# Patient Record
Sex: FEMALE | Race: BLACK OR AFRICAN AMERICAN | NOT HISPANIC OR LATINO | Employment: FULL TIME | URBAN - METROPOLITAN AREA
[De-identification: names, ages, dates, MRNs, and addresses within clinical notes are randomized per-mention and may not be internally consistent; named-entity substitution may affect disease eponyms.]

---

## 2023-02-09 ENCOUNTER — HOSPITAL ENCOUNTER (EMERGENCY)
Facility: HOSPITAL | Age: 48
Discharge: HOME | End: 2023-02-09
Attending: EMERGENCY MEDICINE
Payer: OTHER GOVERNMENT

## 2023-02-09 VITALS
HEART RATE: 74 BPM | BODY MASS INDEX: 30.37 KG/M2 | RESPIRATION RATE: 16 BRPM | TEMPERATURE: 98 F | WEIGHT: 189 LBS | HEIGHT: 66 IN | OXYGEN SATURATION: 96 % | DIASTOLIC BLOOD PRESSURE: 76 MMHG | SYSTOLIC BLOOD PRESSURE: 127 MMHG

## 2023-02-09 DIAGNOSIS — R47.1 DYSARTHRIA: Primary | ICD-10-CM

## 2023-02-09 PROBLEM — R20.0 RIGHT SIDED NUMBNESS: Status: ACTIVE | Noted: 2023-02-09

## 2023-02-09 LAB
ALBUMIN SERPL BCP-MCNC: 4 G/DL (ref 3.5–5.2)
ALP SERPL-CCNC: 122 U/L (ref 55–135)
ALT SERPL W/O P-5'-P-CCNC: 63 U/L (ref 10–44)
ANION GAP SERPL CALC-SCNC: 12 MMOL/L (ref 8–16)
AST SERPL-CCNC: 41 U/L (ref 10–40)
B-HCG UR QL: NEGATIVE
BASOPHILS # BLD AUTO: 0.03 K/UL (ref 0–0.2)
BASOPHILS NFR BLD: 0.5 % (ref 0–1.9)
BILIRUB SERPL-MCNC: 0.2 MG/DL (ref 0.1–1)
BUN SERPL-MCNC: 14 MG/DL (ref 6–20)
CALCIUM SERPL-MCNC: 10.3 MG/DL (ref 8.7–10.5)
CHLORIDE SERPL-SCNC: 110 MMOL/L (ref 95–110)
CHOLEST SERPL-MCNC: 215 MG/DL (ref 120–199)
CHOLEST/HDLC SERPL: 4.1 {RATIO} (ref 2–5)
CO2 SERPL-SCNC: 19 MMOL/L (ref 23–29)
CREAT SERPL-MCNC: 0.8 MG/DL (ref 0.5–1.4)
CREAT SERPL-MCNC: 0.8 MG/DL (ref 0.5–1.4)
CTP QC/QA: YES
DIFFERENTIAL METHOD: ABNORMAL
EOSINOPHIL # BLD AUTO: 0.1 K/UL (ref 0–0.5)
EOSINOPHIL NFR BLD: 0.9 % (ref 0–8)
ERYTHROCYTE [DISTWIDTH] IN BLOOD BY AUTOMATED COUNT: 13.4 % (ref 11.5–14.5)
EST. GFR  (NO RACE VARIABLE): >60 ML/MIN/1.73 M^2
GLUCOSE SERPL-MCNC: 116 MG/DL (ref 70–110)
HCT VFR BLD AUTO: 40.5 % (ref 37–48.5)
HDLC SERPL-MCNC: 52 MG/DL (ref 40–75)
HDLC SERPL: 24.2 % (ref 20–50)
HGB BLD-MCNC: 12.9 G/DL (ref 12–16)
IMM GRANULOCYTES # BLD AUTO: 0.02 K/UL (ref 0–0.04)
IMM GRANULOCYTES NFR BLD AUTO: 0.3 % (ref 0–0.5)
INR PPP: 1 (ref 0.8–1.2)
LDLC SERPL CALC-MCNC: 151.6 MG/DL (ref 63–159)
LYMPHOCYTES # BLD AUTO: 2 K/UL (ref 1–4.8)
LYMPHOCYTES NFR BLD: 30 % (ref 18–48)
MCH RBC QN AUTO: 28 PG (ref 27–31)
MCHC RBC AUTO-ENTMCNC: 31.9 G/DL (ref 32–36)
MCV RBC AUTO: 88 FL (ref 82–98)
MONOCYTES # BLD AUTO: 0.5 K/UL (ref 0.3–1)
MONOCYTES NFR BLD: 7.1 % (ref 4–15)
NEUTROPHILS # BLD AUTO: 4 K/UL (ref 1.8–7.7)
NEUTROPHILS NFR BLD: 61.2 % (ref 38–73)
NONHDLC SERPL-MCNC: 163 MG/DL
NRBC BLD-RTO: 0 /100 WBC
PLATELET # BLD AUTO: 310 K/UL (ref 150–450)
PMV BLD AUTO: 9.7 FL (ref 9.2–12.9)
POC PTINR: 1.8 (ref 0.9–1.2)
POC PTWBT: 21 SEC (ref 9.7–14.3)
POCT GLUCOSE: 102 MG/DL (ref 70–110)
POTASSIUM SERPL-SCNC: 3.8 MMOL/L (ref 3.5–5.1)
PROT SERPL-MCNC: 7.6 G/DL (ref 6–8.4)
PROTHROMBIN TIME: 10.8 SEC (ref 9–12.5)
RBC # BLD AUTO: 4.6 M/UL (ref 4–5.4)
SAMPLE: ABNORMAL
SAMPLE: NORMAL
SODIUM SERPL-SCNC: 141 MMOL/L (ref 136–145)
TRIGL SERPL-MCNC: 57 MG/DL (ref 30–150)
TSH SERPL DL<=0.005 MIU/L-ACNC: 0.53 UIU/ML (ref 0.4–4)
WBC # BLD AUTO: 6.59 K/UL (ref 3.9–12.7)

## 2023-02-09 PROCEDURE — 99283 PR EMERGENCY DEPT VISIT,LEVEL III: ICD-10-PCS | Mod: ,,, | Performed by: STUDENT IN AN ORGANIZED HEALTH CARE EDUCATION/TRAINING PROGRAM

## 2023-02-09 PROCEDURE — 93005 ELECTROCARDIOGRAM TRACING: CPT

## 2023-02-09 PROCEDURE — 99900035 HC TECH TIME PER 15 MIN (STAT)

## 2023-02-09 PROCEDURE — 81025 URINE PREGNANCY TEST: CPT | Performed by: EMERGENCY MEDICINE

## 2023-02-09 PROCEDURE — 99283 EMERGENCY DEPT VISIT LOW MDM: CPT | Mod: ,,, | Performed by: STUDENT IN AN ORGANIZED HEALTH CARE EDUCATION/TRAINING PROGRAM

## 2023-02-09 PROCEDURE — 99285 EMERGENCY DEPT VISIT HI MDM: CPT | Mod: 25

## 2023-02-09 PROCEDURE — 93010 EKG 12-LEAD: ICD-10-PCS | Mod: ,,, | Performed by: INTERNAL MEDICINE

## 2023-02-09 PROCEDURE — 82565 ASSAY OF CREATININE: CPT | Mod: 91

## 2023-02-09 PROCEDURE — 99291 PR CRITICAL CARE, E/M 30-74 MINUTES: ICD-10-PCS | Mod: ,,, | Performed by: EMERGENCY MEDICINE

## 2023-02-09 PROCEDURE — 93010 ELECTROCARDIOGRAM REPORT: CPT | Mod: ,,, | Performed by: INTERNAL MEDICINE

## 2023-02-09 PROCEDURE — 85610 PROTHROMBIN TIME: CPT

## 2023-02-09 PROCEDURE — 84443 ASSAY THYROID STIM HORMONE: CPT | Performed by: EMERGENCY MEDICINE

## 2023-02-09 PROCEDURE — 25000003 PHARM REV CODE 250: Performed by: EMERGENCY MEDICINE

## 2023-02-09 PROCEDURE — 99291 CRITICAL CARE FIRST HOUR: CPT | Mod: ,,, | Performed by: EMERGENCY MEDICINE

## 2023-02-09 PROCEDURE — 85025 COMPLETE CBC W/AUTO DIFF WBC: CPT | Performed by: EMERGENCY MEDICINE

## 2023-02-09 PROCEDURE — 82962 GLUCOSE BLOOD TEST: CPT

## 2023-02-09 PROCEDURE — 80053 COMPREHEN METABOLIC PANEL: CPT | Performed by: EMERGENCY MEDICINE

## 2023-02-09 PROCEDURE — 85610 PROTHROMBIN TIME: CPT | Performed by: EMERGENCY MEDICINE

## 2023-02-09 PROCEDURE — 80061 LIPID PANEL: CPT | Performed by: EMERGENCY MEDICINE

## 2023-02-09 RX ORDER — LORAZEPAM 1 MG/1
1 TABLET ORAL
Status: COMPLETED | OUTPATIENT
Start: 2023-02-09 | End: 2023-02-09

## 2023-02-09 RX ADMIN — LORAZEPAM 1 MG: 1 TABLET ORAL at 06:02

## 2023-02-09 NOTE — HPI
Ms Lamas is a 47yoF with fibromyalgia who presented to the ED on 2/9/22 after acute onset stuttering and RUE numbness. The patient was returning to work at 1230, from lunch, and collapsed while in the elevator. She had no associated LOC. She was caught before hitting the ground by coworkers. LKN and symptom onset at 1230PM. The patient, of note, had another event on 2/7 - during which she had LUE tremor without any other symptoms. The patient, on arrival to ED, had stuttering speech with LUE numbness and L lower face numbness compared to contralateral side. The patient additionally had significant resting and intentional tremor of LUE. VN service consulted for further workup.

## 2023-02-09 NOTE — ED TRIAGE NOTES
Pt reports having syncopal episode with R sided numbness, blurred vision, and tremors to R upper extremity. Pt currently aphasic and states new onset aphasia started today. Pt AAOx4. Pt placed on cardiac monitor and cont pulse ox.

## 2023-02-09 NOTE — SUBJECTIVE & OBJECTIVE
No past medical history on file.  No past surgical history on file.  No family history on file.     Review of patient's allergies indicates:  No Known Allergies    Medications: I have reviewed the current medication administration record.    (Not in a hospital admission)      Review of Systems   Constitutional:  Negative for chills and fever.   HENT:  Negative for rhinorrhea and sneezing.    Eyes:  Negative for discharge and redness.   Respiratory:  Negative for cough and wheezing.    Cardiovascular:  Negative for chest pain and palpitations.   Gastrointestinal:  Negative for nausea and vomiting.   Skin:  Negative for pallor and rash.   Neurological:  Positive for speech difficulty (stuttering speech) and numbness. Negative for facial asymmetry and weakness.   Psychiatric/Behavioral:  Negative for confusion and decreased concentration.    Objective:     Vital Signs (Most Recent):  Temp: 98 °F (36.7 °C) (02/09/23 1426)  Pulse: 72 (02/09/23 1426)  Resp: 16 (02/09/23 1426)  BP: (!) 150/90 (02/09/23 1426)  SpO2: 97 % (02/09/23 1426)    Vital Signs Range (Last 24H):  Temp:  [98 °F (36.7 °C)]   Pulse:  [72]   Resp:  [16]   BP: (150)/(90)   SpO2:  [97 %]     Physical Exam  Constitutional:       Appearance: Normal appearance.   HENT:      Head: Normocephalic and atraumatic.      Nose: Nose normal.      Mouth/Throat:      Mouth: Mucous membranes are moist.      Pharynx: Oropharynx is clear.   Eyes:      General: No scleral icterus.     Extraocular Movements: Extraocular movements intact.      Conjunctiva/sclera: Conjunctivae normal.   Pulmonary:      Effort: Pulmonary effort is normal. No respiratory distress.   Abdominal:      General: Abdomen is flat. There is no distension.   Musculoskeletal:         General: No tenderness or deformity. Normal range of motion.      Cervical back: Normal range of motion. No rigidity.   Skin:     General: Skin is warm and dry.      Coloration: Skin is not jaundiced.   Neurological:       Mental Status: She is alert and oriented to person, place, and time.      Cranial Nerves: No cranial nerve deficit.      Sensory: Sensory deficit present.      Motor: No weakness.      Coordination: Coordination normal.       Neurological Exam:   LOC: alert  Attention Span: Good   Language: No aphasia  Articulation: No dysarthria  Orientation: Person, Place, Time   Visual Fields: Full  EOM (CN III, IV, VI): Full/intact  Facial Sensation (CN V): Facial sensory loss  Facial Movement (CN VII): Symmetric facial expression    Motor: Arm left  Normal 5/5  Leg left  Normal 5/5  Arm right  Normal 5/5  Leg right Normal 5/5  Cerebellum: No evidence of appendicular or axial ataxia  Sensation: Hugo-hypoesthesia right      Laboratory:  CMP:   Recent Labs   Lab 02/09/23  1504   CALCIUM 10.3   ALBUMIN 4.0   PROT 7.6      K 3.8   CO2 19*      BUN 14   CREATININE 0.8   ALKPHOS 122   ALT 63*   AST 41*   BILITOT 0.2     CBC:   Recent Labs   Lab 02/09/23  1504   WBC 6.59   RBC 4.60   HGB 12.9   HCT 40.5      MCV 88   MCH 28.0   MCHC 31.9*       MRI brain/MRA H/N pending

## 2023-02-09 NOTE — ASSESSMENT & PLAN NOTE
Ms Lamas is a 47yoF with fibromyalgia who presented to the ED on 2/9/22 after acute onset stuttering and RUE numbness. The patient was returning to work at 1230, from lunch, and collapsed while in the elevator. She had no associated LOC. She was caught before hitting the ground by coworkers. LKN and symptom onset at 1230PM. The patient, of note, had another event on 2/7 - during which she had LUE tremor without any other symptoms. The patient, on arrival to ED, had stuttering speech with LUE numbness and L lower face numbness compared to contralateral side. The patient additionally had significant resting and intentional tremor of LUE. VN service consulted for further workup.    Patients exam consisting of stuttering speech and R numbness compared to L.  Lower concern for stroke at this time given clinical presentation. Higher concern for panic attack  MRI brain with MRA H/N ordered for further workup to r/o stroke    Will follow up MRI/A; please call when completed  Will sign off if MRI/A are without significant/acute findings  Please call with any other questions or concerns  Appreciate consultation

## 2023-02-09 NOTE — CONSULTS
[facility] - Emergency Dept  Vascular Neurology  [facility]  Consult Note    Inpatient consult to Vascular (Stroke) Neurology  Consult performed by: Everett Krishnamurthy MD  Consult ordered by: Isidro Brady MD      Assessment/Plan:     Patient is a 47 y.o. year old female with:    Right sided numbness  Ms Lamas is a 47yoF with fibromyalgia who presented to the ED on 2/9/22 after acute onset stuttering and RUE numbness. The patient was returning to work at 1230, from lunch, and collapsed while in the elevator. She had no associated LOC. She was caught before hitting the ground by coworkers. LKN and symptom onset at 1230PM. The patient, of note, had another event on 2/7 - during which she had LUE tremor without any other symptoms. The patient, on arrival to ED, had stuttering speech with LUE numbness and L lower face numbness compared to contralateral side. The patient additionally had significant resting and intentional tremor of LUE. VN service consulted for further workup.    Patients exam consisting of stuttering speech and R numbness compared to L.  Lower concern for stroke at this time given clinical presentation. Higher concern for panic attack  MRI brain with MRA H/N ordered for further workup to r/o stroke    Will follow up MRI/A; please call when completed  Will sign off if MRI/A are without significant/acute findings  Please call with any other questions or concerns  Appreciate consultation          STROKE DOCUMENTATION     Acute Stroke Times   Last Known Normal Date: 02/09/23  Last Known Normal Time: 1230  Symptom Onset Date: 02/09/23  Symptom Onset Time: 1230  Stroke Team Called Date: 02/09/23  Stroke Team Called Time: 1433  Stroke Team Arrival Date: 02/09/23  Stroke Team Arrival Time: 1436  Thrombolytic Therapy Recommended: No  Thrombectomy Recommended: No    NIH Scale:  1a. Level of Consciousness: 0-->Alert, keenly responsive  1b. LOC Questions: 0-->Answers both questions correctly  1c.  LOC Commands: 0-->Performs both tasks correctly  2. Best Gaze: 0-->Normal  3. Visual: 0-->No visual loss  4. Facial Palsy: 0-->Normal symmetrical movements  5a. Motor Arm, Left: 0-->No drift, limb holds 90 (or 45) degrees for full 10 secs  5b. Motor Arm, Right: 0-->No drift, limb holds 90 (or 45) degrees for full 10 secs  6a. Motor Leg, Left: 0-->No drift, leg holds 30 degree position for full 5 secs  6b. Motor Leg, Right: 0-->No drift, leg holds 30 degree position for full 5 secs  7. Limb Ataxia: 0-->Absent  8. Sensory: 1-->Mild-to-moderate sensory loss, patient feels pinprick is less sharp or is dull on the affected side, or there is a loss of superficial pain with pinprick, but patient is aware of being touched  9. Best Language: 0-->No aphasia, normal  10. Dysarthria: 0-->Normal  11. Extinction and Inattention (formerly Neglect): 0-->No abnormality  Total (NIH Stroke Scale): 1    Modified Dickinson Score: 1  Miller Coma Scale:    ABCD2 Score:    [id number] Score:   HAS -BLED Score:   ICH Score:   Hunt & Short Classification:       Thrombolysis Candidate? No, Non-disabling symptoms - Low NIHSS     Delays to Thrombolysis?  No    Interventional Revascularization Candidate?   Is the patient eligible for mechanical endovascular reperfusion (TAMIR)?  No; at this time symptoms not suggestive of large vessel occlusion    Delays to Thrombectomy? No    Hemorrhagic change of an Ischemic Stroke: Does this patient have an ischemic stroke with hemorrhagic changes? No     Subjective:     History of Present Illness:  Ms Lamas is a 47yoF with fibromyalgia who presented to the ED on 2/9/22 after acute onset stuttering and RUE numbness. The patient was returning to work at 1230, from lunch, and collapsed while in the elevator. She had no associated LOC. She was caught before hitting the ground by coworkers. LKN and symptom onset at 1230PM. The patient, of note, had another event on 2/7 - during which she had LUE tremor without any  other symptoms. The patient, on arrival to ED, had stuttering speech with LUE numbness and L lower face numbness compared to contralateral side. The patient additionally had significant resting and intentional tremor of LUE. VN service consulted for further workup.       Review of patient's allergies indicates:  No Known Allergies    Medications: I have reviewed the current medication administration record.    (Not in a hospital admission)      Review of Systems   Constitutional:  Negative for chills and fever.   HENT:  Negative for rhinorrhea and sneezing.    Eyes:  Negative for discharge and redness.   Respiratory:  Negative for cough and wheezing.    Cardiovascular:  Negative for chest pain and palpitations.   Gastrointestinal:  Negative for nausea and vomiting.   Skin:  Negative for pallor and rash.   Neurological:  Positive for speech difficulty (stuttering speech) and numbness. Negative for facial asymmetry and weakness.   Psychiatric/Behavioral:  Negative for confusion and decreased concentration.    Objective:     Vital Signs (Most Recent):  Temp: 98 °F (36.7 °C) (02/09/23 1426)  Pulse: 72 (02/09/23 1426)  Resp: 16 (02/09/23 1426)  BP: (!) 150/90 (02/09/23 1426)  SpO2: 97 % (02/09/23 1426)    Vital Signs Range (Last 24H):  Temp:  [98 °F (36.7 °C)]   Pulse:  [72]   Resp:  [16]   BP: (150)/(90)   SpO2:  [97 %]     Physical Exam  Constitutional:       Appearance: Normal appearance.   HENT:      Head: Normocephalic and atraumatic.      Nose: Nose normal.      Mouth/Throat:      Mouth: Mucous membranes are moist.      Pharynx: Oropharynx is clear.   Eyes:      General: No scleral icterus.     Extraocular Movements: Extraocular movements intact.      Conjunctiva/sclera: Conjunctivae normal.   Pulmonary:      Effort: Pulmonary effort is normal. No respiratory distress.   Abdominal:      General: Abdomen is flat. There is no distension.   Musculoskeletal:         General: No tenderness or deformity. Normal range of  motion.      Cervical back: Normal range of motion. No rigidity.   Skin:     General: Skin is warm and dry.      Coloration: Skin is not jaundiced.   Neurological:      Mental Status: She is alert and oriented to person, place, and time.      Cranial Nerves: No cranial nerve deficit.      Sensory: Sensory deficit present.      Motor: No weakness.      Coordination: Coordination normal.       Neurological Exam:   LOC: alert  Attention Span: Good   Language: No aphasia  Articulation: No dysarthria  Orientation: Person, Place, Time   Visual Fields: Full  EOM (CN III, IV, VI): Full/intact  Facial Sensation (CN V): Facial sensory loss  Facial Movement (CN VII): Symmetric facial expression    Motor: Arm left  Normal 5/5  Leg left  Normal 5/5  Arm right  Normal 5/5  Leg right Normal 5/5  Cerebellum: No evidence of appendicular or axial ataxia  Sensation: Huog-hypoesthesia right      Laboratory:  CMP:   Recent Labs   Lab 02/09/23  1504   CALCIUM 10.3   ALBUMIN 4.0   PROT 7.6      K 3.8   CO2 19*      BUN 14   CREATININE 0.8   ALKPHOS 122   ALT 63*   AST 41*   BILITOT 0.2     CBC:   Recent Labs   Lab 02/09/23  1504   WBC 6.59   RBC 4.60   HGB 12.9   HCT 40.5      MCV 88   MCH 28.0   MCHC 31.9*       MRI brain/MRA H/N pending    Everett Krishnamurthy MD  [facility]  Department of Vascular Neurology   [facility]

## 2023-02-09 NOTE — SUBJECTIVE & OBJECTIVE
No past medical history on file.  No past surgical history on file.  No family history on file.     Review of patient's allergies indicates:  Not on File    Medications: I have reviewed the current medication administration record.    (Not in a hospital admission)      Review of Systems   Constitutional:  Negative for chills and fever.   HENT:  Negative for rhinorrhea and sneezing.    Eyes:  Negative for discharge and redness.   Respiratory:  Negative for cough and wheezing.    Cardiovascular:  Negative for chest pain and palpitations.   Gastrointestinal:  Negative for nausea and vomiting.   Skin:  Negative for pallor and rash.   Neurological:  Positive for speech difficulty (stuttering speech) and numbness. Negative for facial asymmetry and weakness.   Psychiatric/Behavioral:  Negative for confusion and decreased concentration.    Objective:     Vital Signs (Most Recent):  Temp: 98 °F (36.7 °C) (02/09/23 1426)  Pulse: 72 (02/09/23 1426)  Resp: 16 (02/09/23 1426)  BP: (!) 150/90 (02/09/23 1426)  SpO2: 97 % (02/09/23 1426)    Vital Signs Range (Last 24H):  Temp:  [98 °F (36.7 °C)]   Pulse:  [72]   Resp:  [16]   BP: (150)/(90)   SpO2:  [97 %]     Physical Exam  Constitutional:       Appearance: Normal appearance.   HENT:      Head: Normocephalic and atraumatic.      Nose: Nose normal.      Mouth/Throat:      Mouth: Mucous membranes are moist.      Pharynx: Oropharynx is clear.   Eyes:      General: No scleral icterus.     Extraocular Movements: Extraocular movements intact.      Conjunctiva/sclera: Conjunctivae normal.   Pulmonary:      Effort: Pulmonary effort is normal. No respiratory distress.   Abdominal:      General: Abdomen is flat. There is no distension.   Musculoskeletal:         General: No tenderness or deformity. Normal range of motion.      Cervical back: Normal range of motion. No rigidity.   Skin:     General: Skin is warm and dry.      Coloration: Skin is not jaundiced.   Neurological:      Mental  Status: She is alert and oriented to person, place, and time.      Cranial Nerves: No cranial nerve deficit.      Sensory: Sensory deficit present.      Motor: No weakness.      Coordination: Coordination normal.       Neurological Exam:   LOC: alert  Attention Span: Good   Language: No aphasia  Articulation: No dysarthria  Orientation: Person, Place, Time   Visual Fields: Full  EOM (CN III, IV, VI): Full/intact  Facial Sensation (CN V): Facial sensory loss  Facial Movement (CN VII): Symmetric facial expression    Motor: Arm left  Normal 5/5  Leg left  Normal 5/5  Arm right  Normal 5/5  Leg right Normal 5/5  Cerebellum: No evidence of appendicular or axial ataxia  Sensation: Hugo-hypoesthesia right      Laboratory:  CMP: No results for input(s): GLUCOSE, CALCIUM, ALBUMIN, PROT, NA, K, CO2, CL, BUN, CREATININE, ALKPHOS, ALT, AST, BILITOT in the last 24 hours.  CBC: No results for input(s): WBC, RBC, HGB, HCT, PLT, MCV, MCH, MCHC in the last 168 hours.

## 2023-02-09 NOTE — ED PROVIDER NOTES
Encounter Date: 2/9/2023       History     Chief Complaint   Patient presents with    Van negative stroke      LKN 12:30pm today, numbness to left side, no headache, no blood thinners, no Hx stroke in past, blurred vision and numbness to right side. Similar episode last Tuesday, resolved.      47-year-old female presents with the abrupt onset of dysarthria.  She said she was out and got back to her office when she all of a sudden had slurred speech.  She is trying to get the words out but is difficult to.  She also notices some numbness to the right arm.  No stressors.  This is never happened to her before.  She did have some tingling to her right arm earlier in the week.  She states she always has some visual problems but no new visual problems.  Denies headache.    Review of patient's allergies indicates:  No Known Allergies  No past medical history on file.  No past surgical history on file.  No family history on file.     Review of Systems    Physical Exam     Initial Vitals [02/09/23 1426]   BP Pulse Resp Temp SpO2   (!) 150/90 72 16 98 °F (36.7 °C) 97 %      MAP       --         Physical Exam    Constitutional: She appears well-developed and well-nourished. She is not diaphoretic. No distress.   HENT:   Head: Normocephalic and atraumatic.   Eyes: Conjunctivae are normal.   Neck: Neck supple. No tracheal deviation present. No JVD present.   Normal range of motion.  Cardiovascular:  Normal rate, regular rhythm, normal heart sounds and intact distal pulses.           Pulmonary/Chest: Breath sounds normal. No respiratory distress. She has no wheezes. She has no rhonchi. She has no rales.   Abdominal: Abdomen is soft. Bowel sounds are normal. She exhibits no distension. There is no abdominal tenderness. There is no rebound.   Musculoskeletal:         General: No edema.      Cervical back: Normal range of motion and neck supple.     Neurological: She is alert. She has normal strength. No sensory deficit. GCS score  is 15. GCS eye subscore is 4. GCS verbal subscore is 5. GCS motor subscore is 6.   Speech is hesitant but her word content is normal   Skin: Skin is warm. No rash noted.   Psychiatric: She has a normal mood and affect.       ED Course   Procedures  Labs Reviewed   CBC W/ AUTO DIFFERENTIAL - Abnormal; Notable for the following components:       Result Value    MCHC 31.9 (*)     All other components within normal limits   COMPREHENSIVE METABOLIC PANEL - Abnormal; Notable for the following components:    CO2 19 (*)     Glucose 116 (*)     AST 41 (*)     ALT 63 (*)     All other components within normal limits   LIPID PANEL - Abnormal; Notable for the following components:    Cholesterol 215 (*)     All other components within normal limits   ISTAT PROCEDURE - Abnormal; Notable for the following components:    POC PTWBT 21.0 (*)     POC PTINR 1.8 (*)     All other components within normal limits   PROTIME-INR   TSH   POCT URINE PREGNANCY   ISTAT CREATININE   POCT GLUCOSE        ECG Results              ECG 12 lead (Final result)  Result time 02/09/23 16:08:40      Final result by Interface, Lab In [facility] (02/09/23 16:08:40)                   Narrative:    Test Reason : I63.9,    Vent. Rate : 074 BPM     Atrial Rate : 074 BPM     P-R Int : 134 ms          QRS Dur : 078 ms      QT Int : 384 ms       P-R-T Axes : 083 019 017 degrees     QTc Int : 426 ms    Normal sinus rhythm with sinus arrhythmia  Normal ECG  No previous ECGs available  Confirmed by Levon QUIROZ MD ([id number]) on 2/9/2023 4:08:35 PM    Referred By: System System           Confirmed By:Levon QUIROZ MD                                  Imaging Results              MRA Neck without contrast (Final result)  Result time 02/09/23 17:18:12      Final result by Bashir Deal MD (02/09/23 17:18:12)                   Impression:      No significant stenosis at the carotid bifurcations by NASCET criteria.  The vertebral arteries are patent.  No evidence of  dissection.    No major branch stenosis/occlusion at the [id number].  No evidence of aneurysm.    Electronically signed by resident: Ruben James  Date:    02/09/2023  Time:    16:42    Electronically signed by: Bashir Deal  Date:    02/09/2023  Time:    17:18               Narrative:    EXAMINATION:  MRA BRAIN WITHOUT CONTRAST; MRA NECK WITHOUT CONTRAST    CLINICAL HISTORY:  Neuro deficit, acute, stroke suspected;    TECHNIQUE:  Non-contrast 3-D time-of-flight intracranial MR angiography was performed through the carotid and vertebral arteries extending through the [place] with MIP reformatting.    COMPARISON:  MRI brain 02/09/2023    FINDINGS:  Bilateral common carotid arteries are normal caliber without significant stenosis.  Bilateral carotid bifurcations are normal caliber without significant stenosis.  Bilateral internal carotid arteries are normal caliber without significant stenosis.    Bilateral vertebral arteries are normal caliber without significant stenosis. Vertebrobasilar system is normal caliber without significant stenosis.    Anterior cerebral arteries are patent without significant stenosis or aneurysm.  Azygous anterior cerebral artery developmentally.  Middle cerebral arteries are patent without significant stenosis or aneurysm.  Posterior cerebral arteries are patent without significant stenosis or aneurysm.  Bilateral posterior cerebral arteries are mainly supplied by the posterior communicating arteries.                                       MRA Brain without contrast (Final result)  Result time 02/09/23 17:18:12      Final result by Bashir Deal MD (02/09/23 17:18:12)                   Impression:      No significant stenosis at the carotid bifurcations by NASCET criteria.  The vertebral arteries are patent.  No evidence of dissection.    No major branch stenosis/occlusion at the [place].  No evidence of aneurysm.    Electronically signed by resident: Ruben  Erika  Date:    02/09/2023  Time:    16:42    Electronically signed by: Bashir Deal  Date:    02/09/2023  Time:    17:18               Narrative:    EXAMINATION:  MRA BRAIN WITHOUT CONTRAST; MRA NECK WITHOUT CONTRAST    CLINICAL HISTORY:  Neuro deficit, acute, stroke suspected;    TECHNIQUE:  Non-contrast 3-D time-of-flight intracranial MR angiography was performed through the carotid and vertebral arteries extending through the [place] with MIP reformatting.    COMPARISON:  MRI brain 02/09/2023    FINDINGS:  Bilateral common carotid arteries are normal caliber without significant stenosis.  Bilateral carotid bifurcations are normal caliber without significant stenosis.  Bilateral internal carotid arteries are normal caliber without significant stenosis.    Bilateral vertebral arteries are normal caliber without significant stenosis. Vertebrobasilar system is normal caliber without significant stenosis.    Anterior cerebral arteries are patent without significant stenosis or aneurysm.  Azygous anterior cerebral artery developmentally.  Middle cerebral arteries are patent without significant stenosis or aneurysm.  Posterior cerebral arteries are patent without significant stenosis or aneurysm.  Bilateral posterior cerebral arteries are mainly supplied by the posterior communicating arteries.                                       MRI Brain Without Contrast (Final result)  Result time 02/09/23 17:02:20      Final result by Bashir Deal MD (02/09/23 17:02:20)                   Impression:      No evidence of acute intracranial pathology.  Normal MR of the brain.    Electronically signed by resident: Ruben James  Date:    02/09/2023  Time:    16:35    Electronically signed by: Bashir Deal  Date:    02/09/2023  Time:    17:02               Narrative:    EXAMINATION:  MRI BRAIN WITHOUT CONTRAST    CLINICAL HISTORY:  Neuro deficit, acute, stroke suspected;    TECHNIQUE:  Multiplanar multisequence MR  imaging of the brain was performed without intravenous contrast.    COMPARISON:  None    FINDINGS:  The brain parenchyma maintains normal signal intensity.  No hemorrhage or edema. No mass effect.  No diffusion restriction to indicate acute infarction.    Ventricles are normal in size without evidence of hydrocephalus.    No extra-axial blood or fluid collections are identified.    Mastoid air cells and paranasal sinuses are unremarkable.    The T2 skull base flow voids are preserved.    Bone marrow signal intensity unremarkable.                                       Medications   LORazepam tablet 1 mg (1 mg Oral Given 2/9/23 1837)     Medical Decision Making:   History:   Old Medical Records: I decided to obtain old medical records.  Initial Assessment:   Patient with abrupt onset of stuttering speech.  It is possible that this is acute stroke.  I activated stroke code and discuss with stroke team.  Highly doubt large vessel occlusion.  Will send for an emergent MRI brain and MRA head and neck.  This decision was made jointly with stroke neurologist.  Discussed with patient who is agreeable with plan..  Clinical Tests:   Lab Tests: Ordered and Reviewed  Radiological Study: Ordered and Reviewed  Medical Tests: Ordered and Reviewed  ED Management:  MRI shows no sign of acute stroke or arterial disease.  Patient's speech is better but still slightly stuttered.  She is enquiring about going home.  She would like to be released.  Would like to give a dose of Ativan reassess    7:32 PM  Patient speaking more clearly.  She is comfortable going home.  Discussed case with stroke.  They are signing off..  Patient states that she has a appointment with her primary care doctor tomorrow.          Attending Attestation:         Attending Critical Care:   Critical Care Times:   Direct Patient Care (initial evaluation, reassessments, and time considering the case)................................................................24  minutes.   Additional History from reviewing old medical records or taking additional history from the family, EMS, PCP, etc.......................4 minutes.   Ordering, Reviewing, and Interpreting Diagnostic Studies...............................................................................................................4 minutes.   Documentation..................................................................................................................................................................................4 minutes.   Consultation with other Physicians. .................................................................................................................................................4 minutes.   ==============================================================  Total Critical Care Time - exclusive of procedural time: 40 minutes.  ==============================================================                     Clinical Impression:   Final diagnoses:  [R47.1] Dysarthria (Primary)        ED Disposition Condition    Discharge Stable          ED Prescriptions    None       Follow-up Information       Follow up With Specialties Details Why Contact [facility] - Emergency Dept Emergency Medicine  If symptoms worsen [address]  [facility] [id number]  [phone]    Your Primary Care Physician  Schedule an appointment as soon as possible for a visit in 1 day               Isidro Brady MD  02/09/23 2007

## 2023-02-10 NOTE — ED NOTES
Pt stable for discharge at this time. VSS, in NAD. AAOX3. Understands discharge instructions and return precautions.